# Patient Record
(demographics unavailable — no encounter records)

---

## 2024-12-23 NOTE — PHYSICAL EXAM
[NL] : regular rate and rhythm, normal S1, S2 audible, no murmurs [FreeTextEntry3] : excessive ear wax b/l

## 2024-12-23 NOTE — HISTORY OF PRESENT ILLNESS
[de-identified] : ear wax [FreeTextEntry6] : 13 yo M with excessive ear wax in ears. Goes to ENT for cleanout but no available appt. Used debrox today. Hearing muffled in Rt ear.

## 2024-12-23 NOTE — DISCUSSION/SUMMARY
[FreeTextEntry1] : 13 yo M here for ear wax buildup clean out. Spent 20 minutes cleaning out ears with both flush and currettes. Successful clean out and he can now hear better.

## 2024-12-23 NOTE — DISCUSSION/SUMMARY
[FreeTextEntry1] : 15 yo M here for ear wax buildup clean out. Spent 20 minutes cleaning out ears with both flush and currettes. Successful clean out and he can now hear better.

## 2024-12-23 NOTE — HISTORY OF PRESENT ILLNESS
[de-identified] : ear wax [FreeTextEntry6] : 13 yo M with excessive ear wax in ears. Goes to ENT for cleanout but no available appt. Used debrox today. Hearing muffled in Rt ear. No. VAL screening performed.  STOP BANG Legend: 0-2 = LOW Risk; 3-4 = INTERMEDIATE Risk; 5-8 = HIGH Risk

## 2025-05-20 NOTE — RISK ASSESSMENT
[No Increased risk of SCA or SCD] : No Increased risk of SCA or SCD    [No] : Risk of tobacco use and health benefits of smoking cessation discussed: No [0] : 2) Feeling down, depressed, or hopeless: Not at all (0) [PHQ-2 Negative - No further assessment needed] : PHQ-2 Negative - No further assessment needed [PHQ-9 Negative - No further assessment needed] : PHQ-9 Negative - No further assessment needed [Have you ever fainted, passed out or had an unexplained seizure suddenly and without warning, especially during exercise or in response] : Have you ever fainted, passed out or had an unexplained seizure suddenly and without warning, especially during exercise or in response to sudden loud noises such as doorbells, alarm clocks and ringing telephones? No [Have you ever had exercise-related chest pain or shortness of breath?] : Have you ever had exercise-related chest pain or shortness of breath? No [Has anyone in your immediate family (parents, grandparents, siblings) or other more distant relatives (aunts, uncles, cousins)  of heart] : Has anyone in your immediate family (parents, grandparents, siblings) or other more distant relatives (aunts, uncles, cousins)  of heart problems or had an unexpected sudden death before age 50 (This would include unexpected drownings, unexplained car accidents in which the relative was driving or sudden infant death syndrome.)? No [Are you related to anyone with hypertrophic cardiomyopathy or hypertrophic obstructive cardiomyopathy, Marfan syndrome, arrhythmogenic] : Are you related to anyone with hypertrophic cardiomyopathy or hypertrophic obstructive cardiomyopathy, Marfan syndrome, arrhythmogenic right ventricular cardiomyopathy, long QT syndrome, short QT syndrome, Brugada syndrome or catecholaminergic polymorphic ventricular tachycardia, or anyone younger than 50 years with a pacemaker or implantable defibrillator? No

## 2025-05-20 NOTE — HISTORY OF PRESENT ILLNESS
[NO] : No [Mother] : mother [Yes] : Patient goes to dentist yearly [Toothpaste] : Primary Fluoride Source: Toothpaste [Up to date] : Up to date [Eats meals with family] : eats meals with family [Has family members/adults to turn to for help] : has family members/adults to turn to for help [Is permitted and is able to make independent decisions] : Is permitted and is able to make independent decisions [Grade: ____] : Grade: [unfilled] [Normal Performance] : normal performance [Normal Behavior/Attention] : normal behavior/attention [Normal Homework] : normal homework [Eats regular meals including adequate fruits and vegetables] : eats regular meals including adequate fruits and vegetables [Drinks non-sweetened liquids] : drinks non-sweetened liquids  [Calcium source] : calcium source [Has concerns about body or appearance] : has concerns about body or appearance [Has friends] : has friends [At least 1 hour of physical activity a day] : at least 1 hour of physical activity a day [Has interests/participates in community activities/volunteers] : has interests/participates in community activities/volunteers. [Uses safety belts/safety equipment] : uses safety belts/safety equipment  [Has peer relationships free of violence] : has peer relationships free of violence [No] : Patient has not had sexual intercourse [HIV Screening Declined] : HIV Screening Declined [Has ways to cope with stress] : has ways to cope with stress [Displays self-confidence] : displays self-confidence [With Teen] : teen [With Parent/Guardian] : parent/guardian [Sleep Concerns] : no sleep concerns [Screen time (except homework) less than 2 hours a day] : no screen time (except homework) less than 2 hours a day [Uses electronic nicotine delivery system] : does not use electronic nicotine delivery system [Exposure to electronic nicotine delivery system] : no exposure to electronic nicotine delivery system [Uses tobacco] : does not use tobacco [Exposure to tobacco] : no exposure to tobacco [Uses drugs] : does not use drugs  [Exposure to drugs] : no exposure to drugs [Drinks alcohol] : does not drink alcohol [Exposure to alcohol] : no exposure to alcohol [Impaired/distracted driving] : no impaired/distracted driving [Has problems with sleep] : does not have problems with sleep [Gets depressed, anxious, or irritable/has mood swings] : does not get depressed, anxious, or irritable/has mood swings [Has thought about hurting self or considered suicide] : has not thought about hurting self or considered suicide [FreeTextEntry7] : No hospitalization/Surgeries, Specialist visit.  [de-identified] : Had an anaphylaxis reaction around 1 month ago while eating out/dessert in [place] [de-identified] : Tennis, bowling, soccer [FreeTextEntry1] : 15 yo well male here for annual physical.  Immunizations UTD.   Pt has seasonal allergies, tree nut allergies, dust mite, apples and cherry allergies.   Pt wears glasses.   Flat feet- wears orthotics.   Retainer. Labs done 5/17/2025- reviewed. wnl Hx near syncope x 3 associated with dehydration or little food intake- seen by cardiology- Dr. Deo Shin 05/30/24.  Medically cleared for all activities and to maintain hydration and caloric demands.  PMHx dysphagia resolved, with weight loss since 11/2020, likely anxiety component.   11/16/2020 [place] Dr. Rivera laryngoscope c/w allergies. 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 Sensitization to almond, brazil nut, pecan and pistachio. Has Epi pen. 2) Recent reaction to raw apple, raw pear, cherries. Unclear if a true IgE-mediated reaction or due to oral allergy syndrome since facial urticaria occurred with extensive contact with food. Avoidance.    11/19/2020- GI- Mack Chaney- Dx-GERD- esophagram WNL, mild distal esophagitis- took Prilosec x 6-8 weeks. 01/14/21 Nutritionist 03/21 Dr Gerry Roldan Ophtho- H/O Amblyopia OS and H/O Esotropia which is now controlled- Wears glasses and F/U yearly 7/11/19- Dr. Demar Hobbs- ENT- S/P Micro direct laryngoscopy and adenoidectomy. 7/2018- Dr. Damon Roldan Endo- Premature Adrenarche/Pubarche- F/U annually Speech delay - ST 2x/week in a group

## 2025-05-20 NOTE — PHYSICAL EXAM

## 2025-05-23 NOTE — HISTORY OF PRESENT ILLNESS
[GI Symptoms] : GI SYMPTOMS [___ Day(s)] : [unfilled] day(s) [Intermittent] : intermittent [Sick Contacts: ___] : no sick contacts [Change in diet] : change in diet [Ate out] : did not eat out [Recent travel: ___] : no recent travel [Recent Sexual Activity] : no recent sexual activity [Recent Antibiotic Use] : no recent antibiotic use [History of recent COVID-19 infection] : no history of recent COVID-19 infection [Sharp] : sharp [Dull] : dull [Fever] : no fever [Weight loss] : no weight loss [Malaise] : no malaise [Thirsty] : not thirsty [Dry Lips] : no dry lips [URI symptoms] : no URI symptoms [Decreased Appetite] : decreased appetite [Nausea] : no nausea [Vomiting] : no vomiting [Diarrhea] : no diarrhea [Constipation] : no constipation [Abdominal Pain] : no abdominal pain [Decreased Urine Output] : no decreased urine output [Rash] : no rash [Myalgia] : no myalgia [Loss of taste] : no loss of taste [Loss of smell] : no loss of smell [Headache] : no headache [Stable] : stable [FreeTextEntry4] : epigastric

## 2025-06-10 NOTE — CONSULT LETTER
[Dear  ___] : Dear  [unfilled], [Courtesy Letter:] : I had the pleasure of seeing your patient, [unfilled], in my office today. [Please see my note below.] : Please see my note below. [This report is provisional, pending the completion of the evaluation.  A final diagnosis and plan will follow.] : This report is provisional, pending the completion of the evaluation.  A final diagnosis and plan will follow. [Consult Closing:] : Thank you very much for allowing me to participate in the care of this patient.  If you have any questions, please do not hesitate to contact me. [Sincerely,] : Sincerely, [FreeTextEntry2] : Syl Mariano MD [FreeTextEntry3] : Kati Mooney MD Attending Physician, Allergy and Immunology , [facility] Department of Medicine and Pediatrics [facility]/Division of Allergy and Immunology   [DrElyssa  ___] : Dr. ORDAZ

## 2025-06-10 NOTE — REVIEW OF SYSTEMS
[Decreased Appetite] : decreased appetite [Eye Itching] : itchy eyes [Rhinorrhea] : rhinorrhea [Nasal Congestion] : nasal congestion [Sneezing] : sneezing [SOB with Exertion] : dyspnea on exertion [Abdominal Pain] : abdominal pain [Decrease In Appetite] : decreased appetite [Nl] : Genitourinary [Pain On Swallowing] : no pain on swallowing [Difficulty Swallowing] : no dysphagia [Heartburn] : no heartburn [Nausea] : no nausea [Vomiting] : no vomiting [Diarrhea] : no diarrhea [Oral Ulcers] : no oral ulcers [FreeTextEntry5] : pectus excavatum

## 2025-06-10 NOTE — HISTORY OF PRESENT ILLNESS
[Asthma] : asthma [Mother] : mother [Home] : at home, [unfilled] , at the time of the visit. [Medical Office: ([facility])___] : at the medical office located in  [Telehealth (audio & video)] : This visit was provided via telehealth using real-time 2-way audio visual technology. [Verbal consent obtained from patient] : the patient, [unfilled] [FreeTextEntry3] : Lydia Roldan, [de-identified] : Verbal consent given on 06/10/2025 at 03:39 PM  by Lydia Roldan, mother of NIC MOLINA .   Nic is a 15 yo male with   FOOD ALLERGY since 10 yo has had allergy to tree nuts two times needed epipen once 5 years ago, and once a few months ago  after eating something that was contaminated with nuts (thought to be due to hazelnut), had swelling of throat, couldn't breathe, swollen uvula, hives all over. Didn't have EpiPen at the time with them. Drove to hospital where he was treated with epi, benadryl, prednisone. Sx improved but hives took about a day to resolve.  ok with milk, egg, wheat, soy, sesame, fish,  never ate shellfish   GASTRITIS after episode of anaphylaxis a few months ago, since then has had some gastritis. Certain foods irritate his stomach. Sometimes takes Pepcid or Prilosec . Had been taking prilosec in the Am and Pepcid at night for 2 weeks and then sx got better. So stopped. But yesterday had some spicy foods and abdominal pain restarted, so mom planning on restarting Prilosec and Pepcid In 2020, had dysphagia - dx'd with gastritis and esophagitis. Lost 10 pounds in 2 months. Resolved with bland diet and some meds. EGD at that time showed 12 eos per hpf, but had been on oral steroids and PPI prior to EGD Too much dairy, greasy or spicy foods trigger sx. Feels pain in stomach. No GERD.  no dysphagia  no GERD no excessive chewing no drinking of water to push food down picky eater, poor appetite  POOR GROWTH decrease in weight and height percentiles  ORAL ALLERGY SYNDROME to raw apple, salter can tolerate cooked version itchy mouth, but also involves skin and swelling, but not fully swelling   ALLERGIC RHINOCONJUNCTIVITIS with high pollen count has itchy eyes, sneezing, nasal congestion use xyzal and flonase prn during april, may, june  there is also a cat at home and he has positive IgE to cat  ASTHMA historically had some SOB when pollen counts are high

## 2025-06-10 NOTE — REASON FOR VISIT
[Routine Follow-Up] : a routine follow-up visit for [Patient] : patient [Mother] : mother [FreeTextEntry2] : food allergy, allergic rhinoconjunctivitis , asthma , abdominal pain, poor growth

## 2025-06-10 NOTE — DATA REVIEWED
[FreeTextEntry1] : 5/2025 cbc with normal eos normal cmp total IgE 176 +IgE apple, peach, cherry , pear IgE almond 1.56 IgE brazilnut 1.22 + component IgE pecan 9.2 IgE pistachio 4.6 IgE walnut 18 + component IgE casheew 3.95 + component IgE hazelnut 22.5 + component  IgE macadamia 1.89 IgE peanut 0.7 + Andressa h 8 only  IgE positive to cat (+feld1), dog, DM, tree

## 2025-06-12 NOTE — PROCEDURE
[Cerumen Impaction] : Cerumen Impaction [FreeTextEntry6] : Indication: ear plugging and discomfort Large amount cerumen cleared left and right ear instrumentation with curettes, forceps and suction. Ear canals and tympanic membranes  unremarkable.

## 2025-06-12 NOTE — ASSESSMENT
[FreeTextEntry1] : Large amount cerumen cleared each ear canal. Ear canals and tympanic membranes  unremarkable. symptoms relieved. F/u  for cerumen treatment  .  1 y

## 2025-06-12 NOTE — REVIEW OF SYSTEMS
[Patient Intake Form Reviewed] : Patient intake form was reviewed [Negative] : Ear [de-identified] : pt denies itching, hearing loss, and pain. Bilateral clogging

## 2025-07-16 NOTE — CONSULT LETTER
[Dear  ___] : Dear  [unfilled], [Consult Letter:] : I had the pleasure of evaluating your patient, [unfilled]. [Please see my note below.] : Please see my note below. [Consult Closing:] : Thank you very much for allowing me to participate in the care of this patient.  If you have any questions, please do not hesitate to contact me. [Sincerely,] : Sincerely, [FreeTextEntry3] : Momo Eaton DO, MSc   of Comprehensive Airway, Respiratory and Esophageal Team Division of Pediatric Gastroenterology, Liver Disease and Nutrition [facility]  [DrElyssa  ___] : Dr. ORDAZ

## 2025-07-16 NOTE — HISTORY OF PRESENT ILLNESS
[de-identified] : 15 year old male presents for referred by allergist, Dr. Mooney, given concern for eosinophilic esophagitis in the setting of reintroduction of tree nuts given abdominal pain and previous dysphagia with esophagitis.  Previously complained of dysphagia to solids when evaluated by another pediatric GI in 2020. 12/2020 EGD with esophagitis, up to 12 eos/HPF may have used steroids within a month on that EGD. Planning to start reintroduction with Dr. Mooney for tree nuts. Abdominal pain since allergic reaction since allergic reaction in April and increased since June. Abdominal pain after eating tends to occur after spicy foods, epigastric and uncomfortable. Will go lay down. No emesis, nausea, [place], heartburn, dysphagia Eats a variety of textures and foods. May take pepcid but does not feel relief. May not eat till the afternoon. Last prednisone dose 6/30/25. BM are every other day, [place] 3, without blood or mucus. They have previously tried to increase water and fiber in the diet without improvement. Negative genetics for Campos for mother.  mother 5 2.5 father 5 10

## 2025-07-16 NOTE — ASSESSMENT
[Educated Patient & Family about Diagnosis] : educated the patient and family about the diagnosis [FreeTextEntry1] : 15 year old male presents for referred by allergist, Dr. Mooney, given concern for eosinophilic esophagitis in the setting of reintroduction of tree nuts given abdominal pain and previous dysphagia with esophagitis.  Recommend: - Given recently treated with prednisone on 6/30 would plan for EGD 8 weeks from last dose -Does not appear that Pepcid provides relief, regardless would stop at least 2 weeks prior to EGD -No changes in diet leading up to endoscopy -MiraLAX 1 cap daily, titrate to effect, monitor stools -Call with questions, concerns, or clinical change -Follow-up after endoscopy

## 2025-07-16 NOTE — PHYSICAL EXAM
[Well Developed] : well developed [NAD] : in no acute distress [Alert and Active] : alert and active [PERRL] : pupils were equal, round, reactive to light  [icteric] : anicteric [Moist & Pink Mucous Membranes] : moist and pink mucous membranes [CTAB] : lungs clear to auscultation bilaterally [Respiratory Distress] : no respiratory distress  [Regular Rate and Rhythm] : regular rate and rhythm [Normal S1, S2] : normal S1 and S2 [Soft] : soft  [Distended] : non distended [Normal Bowel Sounds] : normal bowel sounds [Stool Palpable] : stool palpable [No HSM] : no hepatosplenomegaly appreciated [Normal Tone] : normal tone [Well-Perfused] : well-perfused [Edema] : no edema [Cyanosis] : no cyanosis [Rash] : no rash [Jaundice] : no jaundice [Interactive] : interactive [de-identified] : mild tenderness LLQ